# Patient Record
(demographics unavailable — no encounter records)

---

## 2025-04-25 NOTE — ASSESSMENT
[FreeTextEntry1] : Assessment:   SCOTT MARY is a 55 year y/o F with history and physical exam consistent with RIGHT hip osteoarthritis, secondary to severe multilevel lumbar DDD. Patient is currently under the care of orthopedic specialist in [place], the [facility], and is scheduled to proceed with RT VADIM in August. Patient will be remaining in [place] for work for a period of time prior to surgery, at this time the patient would benefit from continued PT and use of meloxicam.    Plan:   - Lengthy discussion regarding options was had with the patient. Nonsurgical options including but not limited to cortisone, Prescription anti-inflammatory medications (both steroidal and non-steroidal), activity modification, non-impact exercise, maintaining a healthy BMI, and icing were reviewed.   - Discussed Home exercise Program as well as Rest, Ice and elevation.   - At this time after discussion of the options, the patient would benefit from organized Physical Therapy to increase overall functionality.   - The patient was provided with an Rx in office today and was instructed to attend PT for 6-8 weeks in order to increase strength and ROM. Patient agreed with this plan and will begin PT as soon as they can.   - After discussion of options, Patient was provided with a prescription for Meloxicam 15mg Daily. - Time was taken to discuss the importance of proper prescription drug management. They were instructed to take this daily with food for two weeks and then intermittently as needed. The patient was also warned of potential risks/side effects of the medication. These potential risks include bruising, gastrointestinal bleed, gastrointestinal burning, allergic reaction and reduced blood clotting. The patient expressed verbal understanding. I recommend that the patient follow-up with their medical physician to discuss any significant specific potential issues with long term use such as interactions with current medications or with exacerbation of underlying medical morbidities.   - Follow up in as needed at this time.   - The Patient was asked if they had any remaining questions about today's visit and the diagnosis, and all questions were answered. Patient understands the plan going forward.

## 2025-04-25 NOTE — IMAGING
[de-identified] :  RIGHT Lower Extremity examined.   Inspection no gross deformity, skin intact, no erythema Palpation; patient with [ ] tenderness to palpation in the groin, + tenderness over the greater troches ROM: 115 flexion, 5 IR, 35 ER, 30 Abduction, 10 Adduction + Stinchfield - Log roll - FADIR, - NYA - SLR 5/5 motor to lower extremity Sensation intact to light touch throughout all lower extremity dermatomes No numbness in lateral femoral cutaneous nerve 2+ distal pulses + Leg length discrepancy assessed at the medial mal, RT 4-5mm short on the right LE  Xrays from 12/31/24 completed at [facility]  AP Pelvis  of Right hip. + joint line narrowing, subchondral sclerosis and osteophyte formation inferior and superior margin consistent with Severe DJD. No fractures noted.  Physical Exam     Constitutional: Well-groomed and developed. Respiratory: Normal, unlabored breathing. No use of accessory muscles. Skin: No rashes or ulcers. Skin warm and dry. Psychiatric: Oriented to time, place, person and event. No acute distress. Gait: Heel to toe Patient able to walk on toes and heels.   Lumbar spine: Posture: Normal on coronal and sagittal alignment AROM: Flexion 70, with pain Extension 10 Moderate pain with simulated truncal motion   Tenderness: Thoracic: No tenderness on palpation Lumbar: Moderate tenderness on palpation TTP L5-S1 Sacrum/coccyx: no tenderness on palpation Greater trochanteric bursa:  No tenderness PSIS: None   Motor:                          R             L LE:                             IS                    5             5 Quad              5             5 TA                   5             5 EHL                5             5 Gastroc          5             5                  R     L DTR: Patella    2+   2+ Achilles  2+   2+   Sensory: Light touch sensation intact T12-S1 Babinski's Sign: Negative bilaterally Straight leg raise test: Negative bilaterally NYA test: Negative bilaterally Facet loading: Negative bilaterally   4/4/25 completed at [facility]  X-rays of the lumbar is as follows: Lumbar 2 View AP/Lateral: Severe multilevel lumbar DDD, worst at L4/5 and L5/S1 levels, anterior osteophyte formation and at the facets.

## 2025-04-25 NOTE — HISTORY OF PRESENT ILLNESS
[de-identified] : 04/25/2025 HPI: SCOTT MARY is a 55 year y/o F who presents for R hip and lumbar back pain. Patient has been experiencing these symptoms for years. Patient notes symptoms worsen with activities and made better with rest at times. Patient has attempted conservative measures including PT, and meloxicam with mild relief. patient is traveling back and forth from [place] where she receives most of her healthcare. Pt is scheduled for a R VADIM in August, but would like to continue PT in the meantime.    Patient has had a previous examination with orthopedic surgeon on [place] for this issue.   Patient has had previous imaging performed at [ ].   Patient [ ] PMHx of DM. Patient is [ ] on a blood thinner. Patient [ ] smoking hx.     Patient presents today, 55 year F, for evaluation of their right hip Date of Injury/Onset: 1+ year Mechanism of injury: NKI This is not a Work-Related Injury being treated under Worker's Compensation. This is not an athletic injury occurring associated with an interscholastic or organized sports team.   Pain: At Rest: 8 /10 With Activity: 10 /10 Quality of symptoms: pain in right hip   Affecting Sleep: Yes Stiffness upon waking, lasting greater than 30mins: Yes Difficulty with stairs: Yes Difficulty getting in and out of car: Yes Difficulty applying shoe: Yes Sit to stand stiffness: Yes Affects walking short/long distances? Yes Home/Work/Recreation affected? Yes   Improves with:  nothing Worse with:  activity Have you been treated for this in the past? [facility] in [place] Have you had surgery for this in the past? none but scheduled for right VADIM in August   OTC Medicines: [ ] RX medicines:  Meloxicam Heat, Ice, Elevation: None   CSI or Gel Injections: yes gel 4/4/25 Physical Therapy/ HEP: None   Previous Treatment/Imaging/Studies Since Last Visit: yes done in [place]